# Patient Record
Sex: FEMALE | Race: WHITE | NOT HISPANIC OR LATINO | URBAN - METROPOLITAN AREA
[De-identification: names, ages, dates, MRNs, and addresses within clinical notes are randomized per-mention and may not be internally consistent; named-entity substitution may affect disease eponyms.]

---

## 2017-07-28 ENCOUNTER — EMERGENCY (EMERGENCY)
Facility: HOSPITAL | Age: 42
LOS: 1 days | Discharge: HOME | End: 2017-07-28
Attending: EMERGENCY MEDICINE | Admitting: EMERGENCY MEDICINE
Payer: COMMERCIAL

## 2017-07-28 VITALS — TEMPERATURE: 99 F | OXYGEN SATURATION: 100 % | RESPIRATION RATE: 16 BRPM

## 2017-07-28 LAB
ALBUMIN SERPL ELPH-MCNC: 4.7 G/DL — SIGNIFICANT CHANGE UP (ref 3.3–5)
ALP SERPL-CCNC: 55 U/L — SIGNIFICANT CHANGE UP (ref 40–120)
ALT FLD-CCNC: 9 U/L — SIGNIFICANT CHANGE UP (ref 4–33)
APTT BLD: 30.5 SEC — SIGNIFICANT CHANGE UP (ref 27.5–37.4)
AST SERPL-CCNC: 16 U/L — SIGNIFICANT CHANGE UP (ref 4–32)
BASOPHILS # BLD AUTO: 0.02 K/UL — SIGNIFICANT CHANGE UP (ref 0–0.2)
BASOPHILS NFR BLD AUTO: 0.2 % — SIGNIFICANT CHANGE UP (ref 0–2)
BILIRUB SERPL-MCNC: 0.2 MG/DL — SIGNIFICANT CHANGE UP (ref 0.2–1.2)
BUN SERPL-MCNC: 11 MG/DL — SIGNIFICANT CHANGE UP (ref 7–23)
CALCIUM SERPL-MCNC: 9.2 MG/DL — SIGNIFICANT CHANGE UP (ref 8.4–10.5)
CHLORIDE SERPL-SCNC: 101 MMOL/L — SIGNIFICANT CHANGE UP (ref 98–107)
CO2 SERPL-SCNC: 25 MMOL/L — SIGNIFICANT CHANGE UP (ref 22–31)
CREAT SERPL-MCNC: 0.81 MG/DL — SIGNIFICANT CHANGE UP (ref 0.5–1.3)
D DIMER BLD IA.RAPID-MCNC: 518 NG/ML — SIGNIFICANT CHANGE UP
EOSINOPHIL # BLD AUTO: 0.12 K/UL — SIGNIFICANT CHANGE UP (ref 0–0.5)
EOSINOPHIL NFR BLD AUTO: 1.4 % — SIGNIFICANT CHANGE UP (ref 0–6)
GLUCOSE SERPL-MCNC: 111 MG/DL — HIGH (ref 70–99)
HCT VFR BLD CALC: 41.2 % — SIGNIFICANT CHANGE UP (ref 34.5–45)
HGB BLD-MCNC: 13.9 G/DL — SIGNIFICANT CHANGE UP (ref 11.5–15.5)
IMM GRANULOCYTES # BLD AUTO: 0.02 # — SIGNIFICANT CHANGE UP
IMM GRANULOCYTES NFR BLD AUTO: 0.2 % — SIGNIFICANT CHANGE UP (ref 0–1.5)
INR BLD: 0.96 — SIGNIFICANT CHANGE UP (ref 0.88–1.17)
LYMPHOCYTES # BLD AUTO: 1.8 K/UL — SIGNIFICANT CHANGE UP (ref 1–3.3)
LYMPHOCYTES # BLD AUTO: 20.8 % — SIGNIFICANT CHANGE UP (ref 13–44)
MCHC RBC-ENTMCNC: 30.5 PG — SIGNIFICANT CHANGE UP (ref 27–34)
MCHC RBC-ENTMCNC: 33.7 % — SIGNIFICANT CHANGE UP (ref 32–36)
MCV RBC AUTO: 90.5 FL — SIGNIFICANT CHANGE UP (ref 80–100)
MONOCYTES # BLD AUTO: 0.46 K/UL — SIGNIFICANT CHANGE UP (ref 0–0.9)
MONOCYTES NFR BLD AUTO: 5.3 % — SIGNIFICANT CHANGE UP (ref 2–14)
NEUTROPHILS # BLD AUTO: 6.25 K/UL — SIGNIFICANT CHANGE UP (ref 1.8–7.4)
NEUTROPHILS NFR BLD AUTO: 72.1 % — SIGNIFICANT CHANGE UP (ref 43–77)
NRBC # FLD: 0 — SIGNIFICANT CHANGE UP
PLATELET # BLD AUTO: 325 K/UL — SIGNIFICANT CHANGE UP (ref 150–400)
PMV BLD: 10.8 FL — SIGNIFICANT CHANGE UP (ref 7–13)
POTASSIUM SERPL-MCNC: 4.1 MMOL/L — SIGNIFICANT CHANGE UP (ref 3.5–5.3)
POTASSIUM SERPL-SCNC: 4.1 MMOL/L — SIGNIFICANT CHANGE UP (ref 3.5–5.3)
PROT SERPL-MCNC: 7.6 G/DL — SIGNIFICANT CHANGE UP (ref 6–8.3)
PROTHROM AB SERPL-ACNC: 10.7 SEC — SIGNIFICANT CHANGE UP (ref 9.8–13.1)
RBC # BLD: 4.55 M/UL — SIGNIFICANT CHANGE UP (ref 3.8–5.2)
RBC # FLD: 11.7 % — SIGNIFICANT CHANGE UP (ref 10.3–14.5)
SODIUM SERPL-SCNC: 140 MMOL/L — SIGNIFICANT CHANGE UP (ref 135–145)
WBC # BLD: 8.67 K/UL — SIGNIFICANT CHANGE UP (ref 3.8–10.5)
WBC # FLD AUTO: 8.67 K/UL — SIGNIFICANT CHANGE UP (ref 3.8–10.5)

## 2017-07-28 PROCEDURE — 99284 EMERGENCY DEPT VISIT MOD MDM: CPT

## 2017-07-28 PROCEDURE — 93971 EXTREMITY STUDY: CPT | Mod: 26,LT

## 2017-07-28 NOTE — ED PROVIDER NOTE - MEDICAL DECISION MAKING DETAILS
42y F presents to the ED with L calf pain. Pain meds refused and will duplex r/o DVT especially given son's family history of thrombotic storm undifferentiated.

## 2017-07-28 NOTE — ED PROVIDER NOTE - EXTREMITY EXAM
left lower extremity findings/able to ambulate but favoring left lower extremity./right lower extremity findings

## 2017-07-28 NOTE — ED PROVIDER NOTE - OBJECTIVE STATEMENT
42y F with hx of sciatica, presents to the ED with c/o pain on back of leg for last 2d but today pain is settling in L calf. Pt states she has hx of sciatica but feels different from sciatica. Pt describes pain as a throbbing pain. Reports son has hx of thrombotic storm and was in the ICU x2mo. Pt did not take anything for pain. NKDA. Non smoker. Notes she got a physical exam done recently and it is normal. Menstrual period started 2d ago. Works as a pharmaceutical rep, she is always walking.

## 2017-07-28 NOTE — ED PROVIDER NOTE - PROGRESS NOTE DETAILS
GALA KO MD:  D/W family the work up recommended does not apply to the patient and the family screaming in RW.   Demands full work up. GALA KO MD: D/W family and charge nurse.  Family states they don't believe the "hematology consult" because the person isn't here.  Family refuses to leave without full work up.   yelling at Charge nurse. FINESSE Dowd: Pt NAD, VSS. No complaints of chest pain/SOB at this time. Discussed the results of the labs and imaging. D-dimer drawn at the patients request - elevated at 518 the patient is refusing the CTA at this time - the patient has capacity to make medical decisions - understands the risks verses benefits of the imaging. Advised to report back for any new, worsening or concerning symptoms.

## 2017-07-28 NOTE — ED PROVIDER NOTE - CARE PLAN
Principal Discharge DX:	Musculoskeletal pain  Instructions for follow-up, activity and diet:	Rest extremity - avoid strenuous exercise involving affected leg. Follow up with your primary care physician within 1 week for further evaluation. Bring a copy of your results with you to your visit - if symptoms persist repeat the ultrasound in one week for further work up. Return to the Emergency Department for any new, worsening or concerning symptoms Principal Discharge DX:	Musculoskeletal pain  Instructions for follow-up, activity and diet:	Rest extremity - avoid strenuous exercise involving affected leg. Take Tylenol 650mg every 6 hours as needed for pain. Follow up with your primary care physician within 1 week for further evaluation. Bring a copy of your results with you to your visit - repeat the ultrasound in one week for further work up. Return to the Emergency Department for any new, worsening or concerning symptoms

## 2017-07-28 NOTE — ED PROVIDER NOTE - NOTES
D/W via phone.  No blood work is required at this time.  Aware of son's past medical history and relationship.

## 2017-07-28 NOTE — ED PROVIDER NOTE - PLAN OF CARE
Rest extremity - avoid strenuous exercise involving affected leg. Follow up with your primary care physician within 1 week for further evaluation. Bring a copy of your results with you to your visit - if symptoms persist repeat the ultrasound in one week for further work up. Return to the Emergency Department for any new, worsening or concerning symptoms Rest extremity - avoid strenuous exercise involving affected leg. Take Tylenol 650mg every 6 hours as needed for pain. Follow up with your primary care physician within 1 week for further evaluation. Bring a copy of your results with you to your visit - repeat the ultrasound in one week for further work up. Return to the Emergency Department for any new, worsening or concerning symptoms

## 2017-07-28 NOTE — ED ADULT TRIAGE NOTE - CHIEF COMPLAINT QUOTE
Pt with c/o of left calf pain since yesterday, Denies any injury no Medical hx. Pt denies any other complaints in triage states she would like and ultra sound to R/O DVT. Pt reports that her son has hx of DVT.

## 2017-07-28 NOTE — ED PROVIDER NOTE - LOWER EXTREMITY EXAM, LEFT
L calf TTP with muscle spasms. no erythema. no obvious size differential. L calf tenderness to palpation with muscle spasms. no erythema. no obvious size differential.